# Patient Record
Sex: FEMALE | URBAN - METROPOLITAN AREA
[De-identification: names, ages, dates, MRNs, and addresses within clinical notes are randomized per-mention and may not be internally consistent; named-entity substitution may affect disease eponyms.]

---

## 2019-06-10 ENCOUNTER — OFFICE VISIT (OUTPATIENT)
Dept: URBAN - METROPOLITAN AREA CLINIC 17 | Facility: CLINIC | Age: 44
End: 2019-06-10
Payer: COMMERCIAL

## 2019-06-10 PROCEDURE — 92133 CPTRZD OPH DX IMG PST SGM ON: CPT | Performed by: OPHTHALMOLOGY

## 2019-06-10 PROCEDURE — 76514 ECHO EXAM OF EYE THICKNESS: CPT | Performed by: OPHTHALMOLOGY

## 2019-06-10 PROCEDURE — 92020 GONIOSCOPY: CPT | Performed by: OPHTHALMOLOGY

## 2019-06-10 PROCEDURE — 92004 COMPRE OPH EXAM NEW PT 1/>: CPT | Performed by: OPHTHALMOLOGY

## 2019-06-10 RX ORDER — BIMATOPROST 0.1 MG/ML
0.01 % SOLUTION/ DROPS OPHTHALMIC
Qty: 0 | Refills: 5 | Status: INACTIVE
Start: 2019-06-10 | End: 2019-07-08

## 2019-06-10 ASSESSMENT — INTRAOCULAR PRESSURE
OD: 18
OS: 18

## 2019-06-10 NOTE — IMPRESSION/PLAN
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 OU.
-IOP borderline ou -
ONH large CD ratio ou - average CCT's ou - Plan: Discussed diagnosis, explained and understood by patient. Discussed IOP/ONH/Glaucoma management and risks. OCT ordered, performed and reviewed. Start lumigan OS qhs. sample given. Discussed side effects of glaucoma meds. Will continue to monitor condition and symptoms.

## 2019-07-08 ENCOUNTER — OFFICE VISIT (OUTPATIENT)
Dept: URBAN - METROPOLITAN AREA CLINIC 17 | Facility: CLINIC | Age: 44
End: 2019-07-08
Payer: COMMERCIAL

## 2019-07-08 DIAGNOSIS — H40.1334 PIGMENTARY GLAUCOMA, BILATERAL, INDETERMINATE STAGE: ICD-10-CM

## 2019-07-08 PROCEDURE — 92083 EXTENDED VISUAL FIELD XM: CPT | Performed by: OPHTHALMOLOGY

## 2019-07-08 PROCEDURE — 99214 OFFICE O/P EST MOD 30 MIN: CPT | Performed by: OPHTHALMOLOGY

## 2019-07-08 RX ORDER — BIMATOPROST 0.1 MG/ML
0.01 % SOLUTION/ DROPS OPHTHALMIC
Qty: 3 | Refills: 3 | Status: INACTIVE
Start: 2019-07-08 | End: 2019-08-26

## 2019-07-08 ASSESSMENT — INTRAOCULAR PRESSURE
OS: 13
OD: 19

## 2019-07-08 NOTE — IMPRESSION/PLAN
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
-IOP borderline OD
ONH larger CD ratio OU- average CCT's OU Plan: Discussed diagnosis, explained and understood by patient. Discussed IOP/ONH/Glaucoma management and risks. Continue Lumigan OS only QHS Will continue to monitor condition and symptoms.

## 2019-11-13 ENCOUNTER — OFFICE VISIT (OUTPATIENT)
Dept: URBAN - METROPOLITAN AREA CLINIC 17 | Facility: CLINIC | Age: 44
End: 2019-11-13
Payer: COMMERCIAL

## 2019-11-13 PROCEDURE — 99213 OFFICE O/P EST LOW 20 MIN: CPT | Performed by: OPHTHALMOLOGY

## 2019-11-13 ASSESSMENT — INTRAOCULAR PRESSURE
OS: 17
OD: 18

## 2019-11-13 NOTE — IMPRESSION/PLAN
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
-IOP borderline OD
ONH larger CD ratio OU- average CCT's OU Plan: Discussed diagnosis, explained and understood by patient. Discussed IOP/ONH/Glaucoma management and risks. Continue Lumigan QHS OS only. no drops needed OD Will continue to monitor condition and symptoms.

## 2020-05-18 ENCOUNTER — OFFICE VISIT (OUTPATIENT)
Dept: URBAN - METROPOLITAN AREA CLINIC 17 | Facility: CLINIC | Age: 45
End: 2020-05-18
Payer: COMMERCIAL

## 2020-05-18 DIAGNOSIS — H40.1331 PIGMENTARY GLAUCOMA, BILATERAL, MILD STAGE: Primary | ICD-10-CM

## 2020-05-18 PROCEDURE — 99213 OFFICE O/P EST LOW 20 MIN: CPT | Performed by: OPHTHALMOLOGY

## 2020-05-18 RX ORDER — BIMATOPROST 0.1 MG/ML
0.01 % SOLUTION/ DROPS OPHTHALMIC
Qty: 3 | Refills: 4 | Status: ACTIVE
Start: 2020-05-18

## 2020-05-18 ASSESSMENT — INTRAOCULAR PRESSURE
OS: 16
OD: 16

## 2020-05-18 NOTE — IMPRESSION/PLAN
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 CCT average OU ONH/IOP stable OU Plan: Discussed diagnosis, explained and understood by patient. Discussed IOP/ONH/Glaucoma management and risks. Continue Lumigan QHS OS only. no drops needed OD Will continue to monitor condition and symptoms.